# Patient Record
Sex: MALE | Race: WHITE | Employment: OTHER | URBAN - METROPOLITAN AREA
[De-identification: names, ages, dates, MRNs, and addresses within clinical notes are randomized per-mention and may not be internally consistent; named-entity substitution may affect disease eponyms.]

---

## 2019-10-29 ENCOUNTER — OFFICE VISIT (OUTPATIENT)
Dept: FAMILY MEDICINE CLINIC | Facility: CLINIC | Age: 64
End: 2019-10-29
Payer: MEDICARE

## 2019-10-29 DIAGNOSIS — Z48.02 ENCOUNTER FOR REMOVAL OF SUTURES: Primary | ICD-10-CM

## 2019-10-29 PROCEDURE — 99202 OFFICE O/P NEW SF 15 MIN: CPT | Performed by: NURSE PRACTITIONER

## 2019-10-29 RX ORDER — BETAMETHASONE DIPROPIONATE 0.5 MG/G
CREAM TOPICAL
Refills: 0 | COMMUNITY
Start: 2019-07-05

## 2019-10-29 RX ORDER — HYDROXYZINE HYDROCHLORIDE 25 MG/1
TABLET, FILM COATED ORAL
Refills: 5 | COMMUNITY
Start: 2019-10-15

## 2019-10-29 RX ORDER — DOXYCYCLINE 100 MG/1
TABLET ORAL
Refills: 0 | COMMUNITY
Start: 2019-10-15

## 2019-10-29 RX ORDER — CALCIPOTRIENE 50 UG/G
CREAM TOPICAL
Refills: 2 | COMMUNITY
Start: 2019-07-22

## 2019-10-29 RX ORDER — CLOBETASOL PROPIONATE 0.5 MG/G
CREAM TOPICAL
Refills: 1 | COMMUNITY
Start: 2019-10-29

## 2019-10-29 NOTE — PROGRESS NOTES
Patient presents with:  Suture Removal    HPI:  Patient had punch biopsies done 2-3 weeks ago in [facility]. 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 PROCEDURE: suture removal.   LOCATION: 1. Left arm proximal to elbow; 2.   Lower back just left

## 2022-03-02 ENCOUNTER — OFFICE VISIT (OUTPATIENT)
Dept: URBAN - METROPOLITAN AREA CLINIC 63 | Facility: CLINIC | Age: 67
End: 2022-03-02

## 2022-03-21 ENCOUNTER — OFFICE VISIT (OUTPATIENT)
Dept: URBAN - METROPOLITAN AREA CLINIC 63 | Facility: CLINIC | Age: 67
End: 2022-03-21

## 2022-04-19 ENCOUNTER — OFFICE VISIT (OUTPATIENT)
Dept: URBAN - METROPOLITAN AREA CLINIC 63 | Facility: CLINIC | Age: 67
End: 2022-04-19

## 2022-05-18 ENCOUNTER — OFFICE VISIT (OUTPATIENT)
Dept: URBAN - METROPOLITAN AREA CLINIC 63 | Facility: CLINIC | Age: 67
End: 2022-05-18

## 2022-06-02 ENCOUNTER — OFFICE VISIT (OUTPATIENT)
Dept: URBAN - METROPOLITAN AREA SURGERY CENTER 4 | Facility: SURGERY CENTER | Age: 67
End: 2022-06-02

## 2022-07-09 ENCOUNTER — TELEPHONE ENCOUNTER (OUTPATIENT)
Dept: URBAN - METROPOLITAN AREA CLINIC 121 | Facility: CLINIC | Age: 67
End: 2022-07-09

## 2022-07-09 RX ORDER — METFORMIN HCL 500 MG/1
TABLET ORAL ONCE A DAY
Refills: 0 | OUTPATIENT
Start: 2022-04-19 | End: 2022-04-19

## 2022-07-09 RX ORDER — HYDROXYZINE HYDROCHLORIDE 25 MG/1
TABLET, FILM COATED ORAL ONCE A DAY
Refills: 0 | OUTPATIENT
Start: 2022-03-02 | End: 2022-04-19

## 2022-07-09 RX ORDER — MUPIROCIN 20 MG/G
OINTMENT TOPICAL TAKE AS DIRECTED
Refills: 0 | OUTPATIENT
Start: 2020-05-09 | End: 2020-05-14

## 2022-07-09 RX ORDER — HYDROXYZINE HYDROCHLORIDE 25 MG/1
TABLET, FILM COATED ORAL
Refills: 0 | OUTPATIENT
Start: 2020-03-19 | End: 2020-05-14

## 2022-07-09 RX ORDER — HYDROXYZINE HYDROCHLORIDE 25 MG/1
TABLET, FILM COATED ORAL ONCE A DAY
Refills: 0 | OUTPATIENT
Start: 2022-04-19 | End: 2022-04-19

## 2022-07-09 RX ORDER — IBUPROFEN 800 MG/1
TABLET, FILM COATED ORAL AS NEEDED
Refills: 0 | OUTPATIENT
Start: 2022-03-02 | End: 2022-04-19

## 2022-07-09 RX ORDER — MUPIROCIN 20 MG/G
OINTMENT TOPICAL TWICE A DAY
Refills: 0 | OUTPATIENT
Start: 2022-04-19 | End: 2022-04-19

## 2022-07-09 RX ORDER — METFORMIN HCL 500 MG/1
TABLET ORAL
Refills: 0 | OUTPATIENT
Start: 2020-02-28 | End: 2020-02-28

## 2022-07-09 RX ORDER — ROSUVASTATIN CALCIUM 10 MG/1
TABLET, FILM COATED ORAL ONCE A DAY
Refills: 0 | OUTPATIENT
Start: 2022-03-02 | End: 2022-04-19

## 2022-07-09 RX ORDER — CIPROFLOXACIN HYDROCHLORIDE 500 MG/1
ONE TAB, TWICE A DAY, PO, X 10 DAYS TABLET, FILM COATED ORAL TWICE A DAY
Refills: 0 | OUTPATIENT
Start: 2020-03-13 | End: 2020-05-14

## 2022-07-09 RX ORDER — ROSUVASTATIN CALCIUM 10 MG/1
TABLET, FILM COATED ORAL ONCE A DAY
Refills: 0 | OUTPATIENT
Start: 2022-04-19 | End: 2022-05-18

## 2022-07-09 RX ORDER — METFORMIN HCL 500 MG/1
TABLET ORAL TWICE A DAY
Refills: 0 | OUTPATIENT
Start: 2020-02-28 | End: 2020-05-14

## 2022-07-09 RX ORDER — METRONIDAZOLE 500 MG/1
TAKE ONE TAB, THREE TIMES A DAY, PO, X 10 DAYS TABLET ORAL THREE TIMES A DAY
Refills: 0 | OUTPATIENT
Start: 2020-03-13 | End: 2020-05-14

## 2022-07-09 RX ORDER — METFORMIN HCL 500 MG/1
TABLET ORAL ONCE A DAY
Refills: 0 | OUTPATIENT
Start: 2022-03-02 | End: 2022-04-19

## 2022-07-09 RX ORDER — CIPROFLOXACIN HYDROCHLORIDE 500 MG/1
TWICE A DAY TAKE ONE TAB, PO, X14 DAYS TABLET, FILM COATED ORAL TWICE A DAY
Refills: 0 | OUTPATIENT
Start: 2022-03-02 | End: 2022-04-19

## 2022-07-09 RX ORDER — METRONIDAZOLE 500 MG/1
THREE TIMES A DAY, PO X14 DAYS TABLET ORAL THREE TIMES A DAY
Refills: 0 | OUTPATIENT
Start: 2022-03-02 | End: 2022-04-19

## 2022-07-09 RX ORDER — AMOXICILLIN AND CLAVULANATE POTASSIUM 875; 125 MG/1; MG/1
TWICE A DAY, PO X14 DAYS TABLET ORAL TWICE A DAY
Refills: 0 | OUTPATIENT
Start: 2022-04-20 | End: 2022-05-18

## 2022-07-09 RX ORDER — TRIAMCINOLONE ACETONIDE 1 MG/G
CREAM TOPICAL TWICE A DAY
Refills: 0 | OUTPATIENT
Start: 2022-03-02 | End: 2022-04-19

## 2022-07-09 RX ORDER — HYDROXYZINE HYDROCHLORIDE 25 MG/1
TABLET, FILM COATED ORAL
Refills: 0 | OUTPATIENT
Start: 2022-04-19 | End: 2022-05-18

## 2022-07-09 RX ORDER — PROPRANOLOL HYDROCHLORIDE 20 MG/1
TABLET ORAL THREE TIMES A DAY
Refills: 0 | OUTPATIENT
Start: 2022-03-02 | End: 2022-04-19

## 2022-07-09 RX ORDER — MUPIROCIN 20 MG/G
OINTMENT TOPICAL TWICE A DAY
Refills: 0 | OUTPATIENT
Start: 2021-03-11 | End: 2022-04-19

## 2022-07-09 RX ORDER — METFORMIN HCL 500 MG/1
TABLET ORAL
Refills: 0 | OUTPATIENT
Start: 2020-01-30 | End: 2020-02-28

## 2022-07-09 RX ORDER — AMOXICILLIN AND CLAVULANATE POTASSIUM 875; 125 MG/1; MG/1
TWICE A DAY, PO TABLET ORAL TWICE A DAY
Refills: 0 | OUTPATIENT
Start: 2020-04-13 | End: 2020-05-14

## 2022-07-09 RX ORDER — PROPRANOLOL HYDROCHLORIDE 20 MG/1
TABLET ORAL THREE TIMES A DAY
Refills: 0 | OUTPATIENT
Start: 2022-04-19 | End: 2022-05-18

## 2022-07-09 RX ORDER — METFORMIN HCL 500 MG/1
TABLET ORAL TWICE A DAY
Refills: 0 | OUTPATIENT
Start: 2022-04-19 | End: 2022-05-18

## 2022-07-09 RX ORDER — ASPIRIN 81 MG/1
TABLET, COATED ORAL ONCE A DAY
Refills: 0 | OUTPATIENT
Start: 2022-04-19 | End: 2022-05-18

## 2022-07-10 ENCOUNTER — TELEPHONE ENCOUNTER (OUTPATIENT)
Dept: URBAN - METROPOLITAN AREA CLINIC 121 | Facility: CLINIC | Age: 67
End: 2022-07-10

## 2022-07-10 RX ORDER — IBUPROFEN 800 MG/1
TABLET, FILM COATED ORAL AS NEEDED
Refills: 0 | Status: ACTIVE | COMMUNITY
Start: 2022-04-19

## 2022-07-10 RX ORDER — MUPIROCIN 20 MG/G
OINTMENT TOPICAL AS NEEDED
Refills: 0 | Status: ACTIVE | COMMUNITY
Start: 2022-04-19

## 2022-07-10 RX ORDER — MUPIROCIN 20 MG/G
OINTMENT TOPICAL TAKE AS DIRECTED
Refills: 0 | Status: ACTIVE | COMMUNITY
Start: 2020-05-14

## 2022-07-10 RX ORDER — TRIAMCINOLONE ACETONIDE 1 MG/G
CREAM TOPICAL TWICE A DAY
Refills: 0 | Status: ACTIVE | COMMUNITY
Start: 2022-04-19

## 2022-07-10 RX ORDER — ROSUVASTATIN CALCIUM 10 MG/1
TABLET, FILM COATED ORAL ONCE A DAY
Refills: 0 | Status: ACTIVE | COMMUNITY
Start: 2022-05-18

## 2022-07-10 RX ORDER — ASPIRIN 81 MG/1
TABLET, COATED ORAL ONCE A DAY
Refills: 0 | Status: ACTIVE | COMMUNITY
Start: 2022-05-18

## 2022-07-10 RX ORDER — HYDROXYZINE HYDROCHLORIDE 25 MG/1
TABLET, FILM COATED ORAL
Refills: 0 | Status: ACTIVE | COMMUNITY
Start: 2022-05-18

## 2022-07-10 RX ORDER — HYDROXYZINE HYDROCHLORIDE 25 MG/1
TABLET, FILM COATED ORAL
Refills: 0 | Status: ACTIVE | COMMUNITY
Start: 2020-05-14

## 2022-07-10 RX ORDER — METFORMIN HCL 500 MG/1
TABLET ORAL TWICE A DAY
Refills: 0 | Status: ACTIVE | COMMUNITY
Start: 2020-05-14

## 2022-07-10 RX ORDER — PROPRANOLOL HYDROCHLORIDE 20 MG/1
TABLET ORAL THREE TIMES A DAY
Refills: 0 | Status: ACTIVE | COMMUNITY
Start: 2022-05-18

## 2022-07-10 RX ORDER — METFORMIN HCL 500 MG/1
TABLET ORAL TWICE A DAY
Refills: 0 | Status: ACTIVE | COMMUNITY
Start: 2022-05-18

## 2023-04-07 ENCOUNTER — TELEPHONE ENCOUNTER (OUTPATIENT)
Dept: URBAN - METROPOLITAN AREA CLINIC 63 | Facility: CLINIC | Age: 68
End: 2023-04-07

## 2023-04-07 RX ORDER — AMOXICILLIN AND CLAVULANATE POTASSIUM 875; 125 MG/1; MG/1
1 TABLET TABLET, FILM COATED ORAL
Qty: 28 TABLET | Refills: 0 | OUTPATIENT
Start: 2023-04-07 | End: 2023-04-17

## 2023-04-14 ENCOUNTER — TELEPHONE ENCOUNTER (OUTPATIENT)
Dept: URBAN - METROPOLITAN AREA CLINIC 63 | Facility: CLINIC | Age: 68
End: 2023-04-14

## 2023-09-12 ENCOUNTER — WEB ENCOUNTER (OUTPATIENT)
Dept: URBAN - METROPOLITAN AREA CLINIC 63 | Facility: CLINIC | Age: 68
End: 2023-09-12

## 2023-12-01 ENCOUNTER — TELEPHONE ENCOUNTER (OUTPATIENT)
Dept: URBAN - METROPOLITAN AREA CLINIC 63 | Facility: CLINIC | Age: 68
End: 2023-12-01

## 2024-01-17 ENCOUNTER — OFFICE VISIT (OUTPATIENT)
Dept: URBAN - METROPOLITAN AREA CLINIC 63 | Facility: CLINIC | Age: 69
End: 2024-01-17
Payer: MEDICARE

## 2024-01-17 ENCOUNTER — DASHBOARD ENCOUNTERS (OUTPATIENT)
Age: 69
End: 2024-01-17

## 2024-01-17 VITALS
WEIGHT: 256.6 LBS | SYSTOLIC BLOOD PRESSURE: 120 MMHG | TEMPERATURE: 96.8 F | HEART RATE: 65 BPM | BODY MASS INDEX: 38.89 KG/M2 | HEIGHT: 68 IN | OXYGEN SATURATION: 97 % | DIASTOLIC BLOOD PRESSURE: 82 MMHG

## 2024-01-17 DIAGNOSIS — K57.32 DIVERTICULITIS OF SIGMOID COLON: ICD-10-CM

## 2024-01-17 PROCEDURE — 99213 OFFICE O/P EST LOW 20 MIN: CPT | Performed by: NURSE PRACTITIONER

## 2024-01-17 RX ORDER — ASPIRIN 81 MG/1
TABLET, COATED ORAL ONCE A DAY
Refills: 0 | Status: ACTIVE | COMMUNITY
Start: 2022-05-18

## 2024-01-17 RX ORDER — ROSUVASTATIN CALCIUM 10 MG/1
TABLET, FILM COATED ORAL ONCE A DAY
Refills: 0 | Status: ACTIVE | COMMUNITY
Start: 2022-05-18

## 2024-01-17 RX ORDER — IBUPROFEN 800 MG/1
TABLET, FILM COATED ORAL AS NEEDED
Refills: 0 | Status: ACTIVE | COMMUNITY
Start: 2022-04-19

## 2024-01-17 RX ORDER — PROPRANOLOL HYDROCHLORIDE 20 MG/1
TABLET ORAL THREE TIMES A DAY
Refills: 0 | Status: ACTIVE | COMMUNITY
Start: 2022-05-18

## 2024-01-17 RX ORDER — MUPIROCIN 20 MG/G
OINTMENT TOPICAL TAKE AS DIRECTED
Refills: 0 | Status: ACTIVE | COMMUNITY
Start: 2020-05-14

## 2024-01-17 RX ORDER — METFORMIN HCL 500 MG/1
TABLET ORAL TWICE A DAY
Refills: 0 | Status: ACTIVE | COMMUNITY
Start: 2020-05-14

## 2024-01-17 RX ORDER — TRIAMCINOLONE ACETONIDE 1 MG/G
CREAM TOPICAL TWICE A DAY
Refills: 0 | Status: ACTIVE | COMMUNITY
Start: 2022-04-19

## 2024-01-17 RX ORDER — HYDROXYZINE HYDROCHLORIDE 25 MG/1
TABLET, FILM COATED ORAL
Refills: 0 | Status: ACTIVE | COMMUNITY
Start: 2022-05-18

## 2024-01-17 NOTE — HPI-TODAY'S VISIT:
Mathew Snider (Dan) is a very pleasant 68-year-old male seen in follow-up of sigmoid diverticulitis.  Matias presents complaining of an episode of left lower quadrant abdominal pain and symptoms consistent with diverticulitis that occurred when he was vacationing in [place] in September 2023.  He states he found a health clinic who prescribed Augmentin, twice daily for 10 days and his symptoms resolved.  Since that time, he has had no further episodes of left lower quadrant pain and he relates his bowel habits are unremarkable, averaging 1-2/day of formed brown stool.

## 2024-01-17 NOTE — HPI-PREVIOUS IMAGING
CT abdomen and pelvis with contrast/19 April 2022. 1. Acute uncomplicated sigmoid colon diverticulitis. 2. Hepatic steatosis with hepatomegaly. Moderate or severe hepatic steatosis. There is increased risk of progression for steatohepatitis (RUIZ) and eventual cirrhosis. Recommend elastography (FibroScan) for quantification of liver steatosis and fibrosis.

## 2024-01-17 NOTE — HPI-PREVIOUS PROCEDURES
Colonoscopy/02 June 2022. Pandiverticulosis with internal hemorrhoids present. The examination was otherwise normal on direct and retroflexion views. No specimens collected. Recommend repeat colonoscopy in 5 years due to personal history of adenomatous polyps. ********** Colonoscopy/07 May 2020. 2 diminutive hyperplastic polyps removed from the rectosigmoid colon. Pandiverticulosis with internal hemorrhoids present. All remaining findings are negative or benign. Recommend repeat colonoscopy in 5 years due to history of adenomatous polyps.

## 2024-06-17 ENCOUNTER — LAB OUTSIDE AN ENCOUNTER (OUTPATIENT)
Dept: URBAN - METROPOLITAN AREA CLINIC 63 | Facility: CLINIC | Age: 69
End: 2024-06-17

## 2024-06-17 ENCOUNTER — OFFICE VISIT (OUTPATIENT)
Dept: URBAN - METROPOLITAN AREA CLINIC 63 | Facility: CLINIC | Age: 69
End: 2024-06-17
Payer: MEDICARE

## 2024-06-17 VITALS
HEIGHT: 68 IN | HEART RATE: 67 BPM | BODY MASS INDEX: 38.19 KG/M2 | TEMPERATURE: 97.3 F | SYSTOLIC BLOOD PRESSURE: 130 MMHG | OXYGEN SATURATION: 97 % | WEIGHT: 252 LBS | DIASTOLIC BLOOD PRESSURE: 80 MMHG

## 2024-06-17 DIAGNOSIS — K57.92 DIVERTICULITIS: ICD-10-CM

## 2024-06-17 DIAGNOSIS — Z86.010 PERSONAL HISTORY OF COLONIC POLYPS: ICD-10-CM

## 2024-06-17 PROBLEM — 307496006: Status: ACTIVE | Noted: 2024-06-17

## 2024-06-17 PROCEDURE — 99214 OFFICE O/P EST MOD 30 MIN: CPT | Performed by: PHYSICIAN ASSISTANT

## 2024-06-17 RX ORDER — CARBIDOPA AND LEVODOPA 25; 100 MG/1; MG/1
TABLET ORAL
Qty: 270 TABLET | Status: ACTIVE | COMMUNITY

## 2024-06-17 RX ORDER — METFORMIN HCL 500 MG/1
TABLET ORAL TWICE A DAY
Refills: 0 | Status: ACTIVE | COMMUNITY
Start: 2020-05-14

## 2024-06-17 RX ORDER — AMOXICILLIN AND CLAVULANATE POTASSIUM 875; 125 MG/1; MG/1
1 TABLET TABLET, FILM COATED ORAL
Qty: 20 TABLET | Refills: 0 | OUTPATIENT
Start: 2024-06-17 | End: 2024-06-27

## 2024-06-17 RX ORDER — HYDROXYZINE HYDROCHLORIDE 25 MG/1
TABLET, FILM COATED ORAL
Qty: 60 TABLET | Status: ACTIVE | COMMUNITY

## 2024-06-17 RX ORDER — ASPIRIN 81 MG/1
TABLET, COATED ORAL ONCE A DAY
Refills: 0 | Status: ACTIVE | COMMUNITY
Start: 2022-05-18

## 2024-06-17 RX ORDER — PROPRANOLOL HYDROCHLORIDE 20 MG/1
TABLET ORAL THREE TIMES A DAY
Refills: 0 | Status: ACTIVE | COMMUNITY
Start: 2022-05-18

## 2024-06-17 RX ORDER — ROSUVASTATIN CALCIUM 10 MG/1
TAKE ONE TABLET BY MOUTH ONE TIME DAILY TABLET ORAL
Qty: 90 UNSPECIFIED | Refills: 0 | Status: ACTIVE | COMMUNITY

## 2024-06-17 NOTE — HPI-TODAY'S VISIT:
68-year-old male, patient of Dr. Nunn, with CAD, obesity, JOHNATHAN, psoriasis, type 2 diabetes, hypertension is referred to the office after having been admitted to [facility] [place], [place] in March 2024 with diverticulitis.  He presented to the ER on March 17, 2024 with 2 weeks of diarrhea and severe left lower quadrant abdominal pain.  CT scan of the abdomen and pelvis was done which demonstrated acute diverticulitis involving the distal descending colon at the junction with the sigmoid colon.  No perforation or abscess formation.  He was treated with Zosyn.  His symptoms improved and he was discharged on March 19, 2024 with a 10-day course of Augmentin.  He was treated in May 2024 with another course of antibiotics for acute diverticulitis. He saw Dr Cantu about 2 weeks ago and he states he was told she would consider elective resection if he lost 40 pounds. He has a follow up with Dr Cantu in Oct 2024.   He follows up today doing well.  His acute symptoms have all resolved. He has no abdominal pain, pyrosis, dysphagia, odynophagia, nausea, vomiting, abodminal pain, constipation, diarrhea, rectal bleeding.   Colonoscopy 6/2/2022 for follow-up of diverticulitis:Normal colonoscopy to the cecum with the exception of pandiverticulosis and grade 2 internal hemorrhoids.  Repeat colonoscopy was recommended in 5 years for personal history of colon polyps.